# Patient Record
Sex: FEMALE | Race: AMERICAN INDIAN OR ALASKA NATIVE
[De-identification: names, ages, dates, MRNs, and addresses within clinical notes are randomized per-mention and may not be internally consistent; named-entity substitution may affect disease eponyms.]

---

## 2017-11-10 ENCOUNTER — HOSPITAL ENCOUNTER (EMERGENCY)
Dept: HOSPITAL 5 - ED | Age: 32
LOS: 1 days | Discharge: HOME | End: 2017-11-11
Payer: MEDICAID

## 2017-11-10 VITALS — DIASTOLIC BLOOD PRESSURE: 96 MMHG | SYSTOLIC BLOOD PRESSURE: 184 MMHG

## 2017-11-10 DIAGNOSIS — Z3A.01: ICD-10-CM

## 2017-11-10 DIAGNOSIS — Z90.49: ICD-10-CM

## 2017-11-10 DIAGNOSIS — O26.891: Primary | ICD-10-CM

## 2017-11-10 DIAGNOSIS — O24.911: ICD-10-CM

## 2017-11-10 DIAGNOSIS — O16.1: ICD-10-CM

## 2017-11-10 LAB
ALBUMIN SERPL-MCNC: 3 G/DL (ref 3.9–5)
ALBUMIN/GLOB SERPL: 1 %
ALP SERPL-CCNC: 66 UNITS/L (ref 35–129)
ALT SERPL-CCNC: 22 UNITS/L (ref 7–56)
ANION GAP SERPL CALC-SCNC: 21 MMOL/L
BASOPHILS NFR BLD AUTO: 0.5 % (ref 0–1.8)
BILIRUB SERPL-MCNC: < 0.2 MG/DL (ref 0.1–1.2)
BUN SERPL-MCNC: 17 MG/DL (ref 7–17)
BUN/CREAT SERPL: 21 %
CALCIUM SERPL-MCNC: 9.4 MG/DL (ref 8.4–10.2)
CHLORIDE SERPL-SCNC: 96.9 MMOL/L (ref 98–107)
CO2 SERPL-SCNC: 24 MMOL/L (ref 22–30)
EOSINOPHIL NFR BLD AUTO: 0.6 % (ref 0–4.3)
GLUCOSE SERPL-MCNC: 244 MG/DL (ref 65–100)
HCT VFR BLD CALC: 34.7 % (ref 30.3–42.9)
HGB BLD-MCNC: 10.7 GM/DL (ref 10.1–14.3)
MCH RBC QN AUTO: 22 PG (ref 28–32)
MCHC RBC AUTO-ENTMCNC: 31 % (ref 30–34)
MCV RBC AUTO: 70 FL (ref 79–97)
PLATELET # BLD: 307 K/MM3 (ref 140–440)
POTASSIUM SERPL-SCNC: 4.2 MMOL/L (ref 3.6–5)
PROT SERPL-MCNC: 6.1 G/DL (ref 6.3–8.2)
RBC # BLD AUTO: 4.96 M/MM3 (ref 3.65–5.03)
SODIUM SERPL-SCNC: 138 MMOL/L (ref 137–145)
WBC # BLD AUTO: 15.5 K/MM3 (ref 4.5–11)

## 2017-11-10 PROCEDURE — 84702 CHORIONIC GONADOTROPIN TEST: CPT

## 2017-11-10 PROCEDURE — 76801 OB US < 14 WKS SINGLE FETUS: CPT

## 2017-11-10 PROCEDURE — 36415 COLL VENOUS BLD VENIPUNCTURE: CPT

## 2017-11-10 PROCEDURE — 76817 TRANSVAGINAL US OBSTETRIC: CPT

## 2017-11-10 PROCEDURE — 81001 URINALYSIS AUTO W/SCOPE: CPT

## 2017-11-10 PROCEDURE — 85025 COMPLETE CBC W/AUTO DIFF WBC: CPT

## 2017-11-10 PROCEDURE — 80053 COMPREHEN METABOLIC PANEL: CPT

## 2017-11-10 PROCEDURE — 81025 URINE PREGNANCY TEST: CPT

## 2017-11-11 LAB
BACTERIA #/AREA URNS HPF: (no result) /HPF
BILIRUB UR QL STRIP: (no result)
BLOOD UR QL VISUAL: (no result)
KETONES UR STRIP-MCNC: (no result) MG/DL
LEUKOCYTE ESTERASE UR QL STRIP: (no result)
MUCOUS THREADS #/AREA URNS HPF: (no result) /HPF
NITRITE UR QL STRIP: (no result)
PH UR STRIP: 5 [PH] (ref 5–7)
RBC #/AREA URNS HPF: 21 /HPF (ref 0–6)
UROBILINOGEN UR-MCNC: < 2 MG/DL (ref ?–2)
WBC #/AREA URNS HPF: 7 /HPF (ref 0–6)

## 2017-11-11 NOTE — ULTRASOUND REPORT
FINAL REPORT



PROCEDURE:  US OB TRANSVAGINAL 



TECHNIQUE:  Real-time transvaginal sonography of the uterus,

placenta, amniotic fluid, adnexa, and fetus was performed with

image documentation. Measurements were obtained to determine

fetal age/size. M-mode Doppler was used to document fetal

heartbeat. 



HISTORY:  abd pain 



COMPARISON:  No prior studies are available for comparison.



FINDINGS:  

CRL: 4.6 mm, which corresponds to a gestational age of: 6 weeks,

1 days. 



Yolk Sac: Normal.



Embryonic Cardiac Activity: The none identified at this time



Gestational Sac: Normal.



Amniotic fluid: Normal.



Cervix: Normal.



Right Ovary: There is a 5 centimeter septated cyst.



Left Ovary: Not seen



Estimated delivery date: 07/06/2018. 



IMPRESSION:  

There is an intrauterine gestational sac with yolk sac and fetal

pole. Estimated gestational age is 6 weeks and 1 day. No cardiac

activity is identified. Nonviable pregnancy is suspected but

followup is recommended. 



There is a septated possibly hemorrhagic cyst in the right ovary.

## 2017-11-11 NOTE — ULTRASOUND REPORT
FINAL REPORT



PROCEDURE:  US OB \T\lt; = 14 WEEKS FETUS



TECHNIQUE:  Real-time transabdominal sonography of the uterus,

placenta, amniotic fluid, adnexa, and fetus was performed with

image documentation. Measurements were obtained to determine

fetal age/size. M-mode Doppler was used to document fetal

heartbeat. CPT 82466 



HISTORY:  abd pain 



COMPARISON:  No prior studies are available for comparison.



FINDINGS:  

CRL: 4.6 mm, which corresponds to a gestational age of: 6 weeks,

1 days. 



Yolk Sac: Normal.



Embryonic Cardiac Activity: The none identified at this time



Gestational Sac: Normal.



Amniotic fluid: Normal.



Cervix: Normal.



Right Ovary: There is a 5 centimeter septated cyst.



Left Ovary: Not seen



Estimated delivery date: 07/06/2018. 



Uterus and adnexa: Normal.



IMPRESSION:  

There is an intrauterine gestational sac with yolk sac and fetal

pole. Estimated gestational age is 6 weeks and 1 day. No cardiac

activity is identified. Nonviable pregnancy is suspected but

followup is recommended. 



There is a septated possibly hemorrhagic cyst in the right ovary.

## 2017-11-11 NOTE — EMERGENCY DEPARTMENT REPORT
ED Pregnancy HPI





- General


Chief complaint: OB/Uterine Contractions


Stated complaint: R/O ECTOPIC PREGNANCY


Time Seen by Provider: 17 02:12


Source: patient


Mode of arrival: Ambulatory


Limitations: No Limitations





- History of Present Illness


Initial comments: 





PT SENT BY OB TO EVALUATE FOR ECTOPIC PREGNANCY. NO ABDOMINAL PAIN, NO VAGINAL 

BLEEDING, NO COMPLAINTS


MD Complaint: other (QUESTIOMNABLE ECTOPIC)


Severity scale (0 -10): 0


Improves with: none


Worsens with: none


Associated symptoms: denies other symptoms


Vaginal bleeding: none


Pregnant:: Yes


OB History - Current Pregnancy: no complications


Pre- care: followed by OB





- Related Data


 Allergies











Allergy/AdvReac Type Severity Reaction Status Date / Time


 


magnesium Allergy  Anaphylaxis Verified 11/10/17 15:51














ED Review of Systems


ROS: 


Stated complaint: R/O ECTOPIC PREGNANCY


Other details as noted in HPI





Constitutional: denies: chills, fever


Eyes: denies: eye pain, eye discharge, vision change


ENT: denies: ear pain, throat pain


Respiratory: denies: cough, shortness of breath, wheezing


Cardiovascular: denies: chest pain, palpitations


Endocrine: no symptoms reported


Gastrointestinal: denies: abdominal pain, nausea, diarrhea


Genitourinary: denies: urgency, dysuria, discharge


Musculoskeletal: denies: back pain, joint swelling, arthralgia


Skin: denies: rash, lesions


Neurological: denies: headache, weakness, paresthesias


Psychiatric: denies: anxiety, depression


Hematological/Lymphatic: denies: easy bleeding, easy bruising





ED Past Medical Hx





- Past Medical History


Hx Hypertension: Yes


Hx Diabetes: Yes





- Surgical History


Past Surgical History?: Yes


Hx Cholecystectomy: Yes


Additional Surgical History: csection





- Social History


Smoking Status: Never Smoker


Substance Use Type: None





ED Physical Exam





- General


Limitations: No Limitations


General appearance: alert, in no apparent distress





- Head


Head exam: Present: atraumatic, normocephalic





- Eye


Eye exam: Present: normal appearance





- ENT


ENT exam: Present: mucous membranes moist





- Neck


Neck exam: Present: normal inspection





- Respiratory


Respiratory exam: Present: normal lung sounds bilaterally.  Absent: respiratory 

distress





- Cardiovascular


Cardiovascular Exam: Present: regular rate, normal rhythm.  Absent: systolic 

murmur, diastolic murmur, rubs, gallop





- GI/Abdominal


GI/Abdominal exam: Present: soft, normal bowel sounds, other (LOARGE 

CENTRIPITAL FAT).  Absent: tenderness, guarding, rebound





- Rectal


Rectal exam: Present: deferred





- Extremities Exam


Extremities exam: Present: normal inspection, full ROM





- Back Exam


Back exam: Present: normal inspection, full ROM





- Neurological Exam


Neurological exam: Present: alert, oriented X3





- Psychiatric


Psychiatric exam: Present: normal affect, normal mood





- Skin


Skin exam: Present: warm, dry, intact, normal color.  Absent: rash





ED Course





 Vital Signs











  11/10/17





  15:53


 


Temperature 98.7 F


 


Pulse Rate 80


 


Respiratory 16





Rate 


 


Blood Pressure 184/96


 


O2 Sat by Pulse 97





Oximetry 














ED Medical Decision Making





- Lab Data


Result diagrams: 


 11/10/17 16:11





 11/10/17 16:11





- Radiology Data


Radiology results: report reviewed (us; SIUP 6W AND 1 DAY, NO HEART BEAT)


Critical care attestation.: 


If time is entered above; I have spent that time in minutes in the direct care 

of this critically ill patient, excluding procedure time.








ED Disposition


Clinical Impression: 


Pregnancy


Qualifiers:


 Weeks of gestation: less than 8 weeks Qualified Code(s): Z3A.01 - Less than 8 

weeks gestation of pregnancy





Disposition: DC-01 TO HOME OR SELFCARE


Is pt being admited?: No


Does the pt Need Aspirin: No


Condition: Stable


Instructions:  Pregnancy (ED)


Referrals: 


PRIMARY CARE,MD [Primary Care Provider] - 3-5 Days

## 2019-04-28 ENCOUNTER — HOSPITAL ENCOUNTER (EMERGENCY)
Dept: HOSPITAL 5 - ED | Age: 34
Discharge: OTHER INSTITUTION | End: 2019-04-28
Payer: MEDICAID

## 2019-04-28 VITALS — DIASTOLIC BLOOD PRESSURE: 90 MMHG | SYSTOLIC BLOOD PRESSURE: 167 MMHG

## 2019-04-28 DIAGNOSIS — J44.9: ICD-10-CM

## 2019-04-28 DIAGNOSIS — I50.9: ICD-10-CM

## 2019-04-28 DIAGNOSIS — Z90.49: ICD-10-CM

## 2019-04-28 DIAGNOSIS — Z88.8: ICD-10-CM

## 2019-04-28 DIAGNOSIS — I11.0: ICD-10-CM

## 2019-04-28 DIAGNOSIS — R51: ICD-10-CM

## 2019-04-28 DIAGNOSIS — R07.89: ICD-10-CM

## 2019-04-28 DIAGNOSIS — E11.9: ICD-10-CM

## 2019-04-28 DIAGNOSIS — Z87.891: ICD-10-CM

## 2019-04-28 DIAGNOSIS — R42: ICD-10-CM

## 2019-04-28 DIAGNOSIS — R06.02: Primary | ICD-10-CM

## 2019-04-28 LAB
APTT BLD: 21.5 SEC. (ref 24.2–36.6)
BASOPHILS # (AUTO): 0.1 K/MM3 (ref 0–0.1)
BASOPHILS NFR BLD AUTO: 1.1 % (ref 0–1.8)
BUN SERPL-MCNC: 17 MG/DL (ref 7–17)
BUN/CREAT SERPL: 17 %
CALCIUM SERPL-MCNC: 9 MG/DL (ref 8.4–10.2)
EOSINOPHIL # BLD AUTO: 0.1 K/MM3 (ref 0–0.4)
EOSINOPHIL NFR BLD AUTO: 1.1 % (ref 0–4.3)
HCT VFR BLD CALC: 32 % (ref 30.3–42.9)
HEMOLYSIS INDEX: 4
HGB BLD-MCNC: 9.3 GM/DL (ref 10.1–14.3)
INR PPP: 0.95 (ref 0.87–1.13)
LYMPHOCYTES # BLD AUTO: 3.6 K/MM3 (ref 1.2–5.4)
LYMPHOCYTES NFR BLD AUTO: 28.2 % (ref 13.4–35)
MCHC RBC AUTO-ENTMCNC: 29 % (ref 30–34)
MCV RBC AUTO: 69 FL (ref 79–97)
MONOCYTES # (AUTO): 1.2 K/MM3 (ref 0–0.8)
MONOCYTES % (AUTO): 9.2 % (ref 0–7.3)
PLATELET # BLD: 404 K/MM3 (ref 140–440)
RBC # BLD AUTO: 4.65 M/MM3 (ref 3.65–5.03)

## 2019-04-28 PROCEDURE — 85025 COMPLETE CBC W/AUTO DIFF WBC: CPT

## 2019-04-28 PROCEDURE — 80048 BASIC METABOLIC PNL TOTAL CA: CPT

## 2019-04-28 PROCEDURE — 36415 COLL VENOUS BLD VENIPUNCTURE: CPT

## 2019-04-28 PROCEDURE — 93010 ELECTROCARDIOGRAM REPORT: CPT

## 2019-04-28 PROCEDURE — 99285 EMERGENCY DEPT VISIT HI MDM: CPT

## 2019-04-28 PROCEDURE — 96375 TX/PRO/DX INJ NEW DRUG ADDON: CPT

## 2019-04-28 PROCEDURE — 71045 X-RAY EXAM CHEST 1 VIEW: CPT

## 2019-04-28 PROCEDURE — 83880 ASSAY OF NATRIURETIC PEPTIDE: CPT

## 2019-04-28 PROCEDURE — 84703 CHORIONIC GONADOTROPIN ASSAY: CPT

## 2019-04-28 PROCEDURE — 84484 ASSAY OF TROPONIN QUANT: CPT

## 2019-04-28 PROCEDURE — 85730 THROMBOPLASTIN TIME PARTIAL: CPT

## 2019-04-28 PROCEDURE — 96374 THER/PROPH/DIAG INJ IV PUSH: CPT

## 2019-04-28 PROCEDURE — 85610 PROTHROMBIN TIME: CPT

## 2019-04-28 PROCEDURE — 93005 ELECTROCARDIOGRAM TRACING: CPT

## 2019-04-28 PROCEDURE — 82962 GLUCOSE BLOOD TEST: CPT

## 2019-04-28 NOTE — EMERGENCY DEPARTMENT REPORT
ED General Adult HPI





- General


Chief complaint: Dizziness


Stated complaint: DIZZINESS,BILATERAL FOOT SWELLING


Time Seen by Provider: 04/28/19 04:53


Source: patient


Mode of arrival: Ambulatory


Limitations: No Limitations





- History of Present Illness


Initial comments: 








Patient is a 33-year-old female that presents emergency room with complaints of 

shortness of breath, dizziness, headache, chest pain and bilateral lower 

extremity swelling.  Patient states her symptoms started 2 weeks ago.  Patient 

states she spent one week in [facility], [place] same problems.  Patient 

states her symptoms are worsening.  Patient states that her blood pressure has 

been fluctuating.  Patient states her shortness of breath and chest pain are 

worse with exertion and better with rest.  Patient states her headache and 

dizziness are better with rest and worse with exertion.  Patient states her 

headache and chest pain are a 10 out of 10.  Patient states that the chest pain 

and headache are nonradiating.





-: Sudden


Location: chest


Radiation: non-radiation


Severity scale (0 -10): 10


Quality: stabbing, sharp


Consistency: constant


Improves with: rest


Worsens with: movement, other


Associated Symptoms: chest pain.  denies: cough, diaphoresis, fever/chills





- Related Data


                                    Allergies











Allergy/AdvReac Type Severity Reaction Status Date / Time


 


magnesium Allergy  Anaphylaxis Verified 11/10/17 15:51














ED Review of Systems


ROS: 


Stated complaint: DIZZINESS,BILATERAL FOOT SWELLING


Other details as noted in HPI





Constitutional: denies: chills, fever


Eyes: denies: eye pain, eye discharge, vision change


ENT: denies: ear pain, throat pain


Respiratory: shortness of breath, SOB with exertion, SOB at rest.  denies: 

cough, wheezing


Cardiovascular: chest pain, dyspnea on exertion, edema.  denies: palpitations


Endocrine: no symptoms reported


Gastrointestinal: denies: abdominal pain, nausea, diarrhea


Genitourinary: denies: urgency, dysuria, discharge


Musculoskeletal: denies: back pain, joint swelling, arthralgia


Skin: denies: rash, lesions


Neurological: headache.  denies: weakness, paresthesias


Psychiatric: denies: anxiety, depression


Hematological/Lymphatic: denies: easy bleeding, easy bruising





ED Past Medical Hx





- Past Medical History


Previous Medical History?: Yes


Hx Hypertension: Yes


Hx Diabetes: Yes


Hx COPD: Yes ("Chronic organizing Pneumonia")


Additional medical history: Fluid in lungs, uses a special nebulizer at home/ 

name?





- Surgical History


Past Surgical History?: Yes


Hx Cholecystectomy: Yes


Additional Surgical History: csection





- Family History


Family history: no significant





- Social History


Smoking Status: Former Smoker


Substance Use Type: None





ED Physical Exam





- General


Limitations: No Limitations


General appearance: alert, in no apparent distress





- Head


Head exam: Present: atraumatic, normocephalic





- Eye


Eye exam: Present: normal appearance





- ENT


ENT exam: Present: mucous membranes moist





- Neck


Neck exam: Present: normal inspection





- Respiratory


Respiratory exam: Present: rales.  Absent: respiratory distress





- Cardiovascular


Cardiovascular Exam: Present: regular rate, normal rhythm.  Absent: systolic 

murmur, diastolic murmur, rubs, gallop





- GI/Abdominal


GI/Abdominal exam: Present: soft, normal bowel sounds.  Absent: distended, 

tenderness





- Extremities Exam


Extremities exam: Present: full ROM, pedal edema.  Absent: tenderness





- Back Exam


Back exam: Present: normal inspection





- Neurological Exam


Neurological exam: Present: alert, oriented X3





- Psychiatric


Psychiatric exam: Present: normal affect, normal mood





- Skin


Skin exam: Present: warm, dry, intact, normal color.  Absent: rash





ED Course


                                   Vital Signs











  04/28/19 04/28/19 04/28/19





  03:10 03:45 05:00


 


Temperature 98.8 F  


 


Pulse Rate 88 87 


 


Respiratory 20  18





Rate   


 


Blood Pressure 208/113 208/113 


 


O2 Sat by Pulse 97  98





Oximetry   














  04/28/19 04/28/19 04/28/19





  05:35 05:47 06:00


 


Temperature   


 


Pulse Rate   


 


Respiratory 18  





Rate   


 


Blood Pressure  187/107 189/97


 


O2 Sat by Pulse   94





Oximetry   














  04/28/19 04/28/19 04/28/19





  06:17 06:31 06:45


 


Temperature   


 


Pulse Rate   


 


Respiratory   





Rate   


 


Blood Pressure 189/97 189/97 180/95


 


O2 Sat by Pulse 96 93 94





Oximetry   














  04/28/19





  07:00


 


Temperature 


 


Pulse Rate 


 


Respiratory 





Rate 


 


Blood Pressure 167/90


 


O2 Sat by Pulse 92





Oximetry 














- Reevaluation(s)


Reevaluation #1: 


Patient unable to have a CT of her head due to the patient's weight.  Patient 

will be transferred.


04/28/19 05:44








- Consultations


Consultation #1: 


[facility] transfer center consulted


04/28/19 05:44








Patient has been accepted by Dr. Pacheco at [facility].


04/28/19 06:19











ED Medical Decision Making





- Lab Data


Result diagrams: 


                                 04/28/19 03:33





                                 04/28/19 03:33





- EKG Data


-: EKG Interpreted by Me


EKG shows normal: sinus rhythm, axis, intervals, QRS complexes, ST-T waves


Rate: normal





- Radiology Data


Radiology results: report reviewed





PROCEDURE: XR CHEST 1V AP 





 TECHNIQUE: Chest radiograph single view. 





 HISTORY: Chest Pain 





 COMPARISONS: None . 





 FINDINGS: 





 Heart: Normal. 


 Mediastinum/Vessels: Normal. 


 Lungs/Pleural space: Infiltrates identified in both lower lungs. Mild bilateral

effusions. No 


 pneumothorax. 


 Bony thorax: No acute osseous abnormality. 


 Life support devices: None. 





 IMPRESSION: Mild infiltrate. Both lower lungs. Mild bilateral effusions.. 





- Medical Decision Making





Patient is a 33-year-old female that presents to emergency room with complaints 

of headache, dizziness, shortness of breath, chest pain and lower external 

swelling.  Patient found to have CHF exacerbation.  Patient was going to have a 

CT done with the patient's weight and not allow us to scan her head.  Patient 

will be transferred to another facility that can take care of a patient of her 

weight.  Patient will be transferred to [facility], accepted by Dr. Robertson.  Hx x-ray shows CHF changes.  Patient's BNP elevated.  Patient negative.

 EKG normal.





- Differential Diagnosis


 shortness of breath, chest pain, CHF.  Headache.  Dizziness.


Critical Care Time: Yes


Critical care attestation.: 


If time is entered above; I have spent that time in minutes in the direct care 

of this critically ill patient, excluding procedure time.





Critical Care Time: 





35 minutes





ED Disposition


Clinical Impression: 


 SOB (shortness of breath), BAKER (dyspnea on exertion), Dizziness





Chest pain


Qualifiers:


 Chest pain type: unspecified Qualified Code(s): R07.9 - Chest pain, unspecified





Headache


Qualifiers:


 Headache type: unspecified Headache chronicity pattern: acute headache 

Intractability: intractable Qualified Code(s): R51 - Headache





Acute exacerbation of CHF (congestive heart failure)


Qualifiers:


 Heart failure type: unspecified Qualified Code(s): I50.9 - Heart failure, 

unspecified





Disposition: DC/TX-70 ANOTHER TYPE HLTHCARE


Is pt being admited?: No


Does the pt Need Aspirin: No


Condition: Critical


Time of Disposition: 06:20

## 2019-04-28 NOTE — XRAY REPORT
PROCEDURE: XR CHEST 1V AP 

 

TECHNIQUE:  Chest radiograph single view.  

 

HISTORY:  Chest Pain  

 

COMPARISONS: None . 

 

FINDINGS: 

 

Heart: Normal. 

Mediastinum/Vessels: Normal. 

Lungs/Pleural space:  Infiltrates identified in both lower lungs. Mild bilateral effusions. No pneumo
thorax. 

Bony thorax: No acute osseous abnormality. 

Life support devices: None. 

 

IMPRESSION:  Mild infiltrate. Both lower lungs. Mild bilateral effusions.. 

 

This document is electronically signed by Cynthia Art DO., April 28 2019 05:11:14 AM ET